# Patient Record
Sex: FEMALE | Race: WHITE | URBAN - METROPOLITAN AREA
[De-identification: names, ages, dates, MRNs, and addresses within clinical notes are randomized per-mention and may not be internally consistent; named-entity substitution may affect disease eponyms.]

---

## 2017-09-05 ENCOUNTER — IMPORTED ENCOUNTER (OUTPATIENT)
Dept: URBAN - METROPOLITAN AREA CLINIC 50 | Facility: CLINIC | Age: 72
End: 2017-09-05

## 2017-09-18 ENCOUNTER — IMPORTED ENCOUNTER (OUTPATIENT)
Dept: URBAN - METROPOLITAN AREA CLINIC 50 | Facility: CLINIC | Age: 72
End: 2017-09-18

## 2018-05-17 NOTE — PATIENT DISCUSSION
RECURRENT EROSION:  I have explained the nature of this process. Successful management will require prevention of corneal epithelial breakdown for an extended period of time. Lubricating ointment applied at bedtime for an extended period of time should help prevent a recurrence.

## 2018-09-24 ENCOUNTER — IMPORTED ENCOUNTER (OUTPATIENT)
Dept: URBAN - METROPOLITAN AREA CLINIC 50 | Facility: CLINIC | Age: 73
End: 2018-09-24

## 2019-10-07 ENCOUNTER — IMPORTED ENCOUNTER (OUTPATIENT)
Dept: URBAN - METROPOLITAN AREA CLINIC 50 | Facility: CLINIC | Age: 74
End: 2019-10-07

## 2020-10-14 ENCOUNTER — IMPORTED ENCOUNTER (OUTPATIENT)
Dept: URBAN - METROPOLITAN AREA CLINIC 50 | Facility: CLINIC | Age: 75
End: 2020-10-14

## 2021-04-18 ASSESSMENT — VISUAL ACUITY
OD_CC: J1+
OD_BAT: 20/25
OS_BAT: 20/25
OD_BAT: 20/25
OS_CC: 20/25
OD_OTHER: 20/30. 20/40.
OD_OTHER: 20/25. 20/25-.
OD_CC: 20/30-1
OS_CC: 20/20
OS_CC: J1+
OD_CC: J1+
OD_OTHER: 20/30. 20/30.
OS_OTHER: 20/25. 20/25.
OD_CC: 20/30
OS_OTHER: 20/25. 20/30.
OD_CC: 20/20-
OS_CC: J1+
OS_BAT: 20/25
OS_BAT: 20/25
OS_CC: J1
OS_CC: 20/25-2
OS_CC: J1+@ 15 IN
OS_CC: 20/25
OD_CC: 20/40+
OS_OTHER: 20/25. 20/60.
OS_BAT: 20/25
OD_BAT: 20/30
OS_OTHER: 20/25. 20/25-.
OD_CC: J1
OD_BAT: 20/30
OD_OTHER: 20/25. 20/30.
OD_CC: J1+@ 15 IN
OD_PH: 20/25

## 2021-04-18 ASSESSMENT — TONOMETRY
OS_IOP_MMHG: 16
OD_IOP_MMHG: 17
OD_IOP_MMHG: 17
OS_IOP_MMHG: 17
OS_IOP_MMHG: 18
OD_IOP_MMHG: 16
OD_IOP_MMHG: 19
OS_IOP_MMHG: 16

## 2021-10-13 ENCOUNTER — PREPPED CHART (OUTPATIENT)
Dept: URBAN - METROPOLITAN AREA CLINIC 49 | Facility: CLINIC | Age: 76
End: 2021-10-13

## 2021-10-18 ENCOUNTER — ANNUAL COMPREHENSIVE EXAM (OUTPATIENT)
Dept: URBAN - METROPOLITAN AREA CLINIC 49 | Facility: CLINIC | Age: 76
End: 2021-10-18

## 2021-10-18 DIAGNOSIS — H35.362: ICD-10-CM

## 2021-10-18 DIAGNOSIS — H25.13: ICD-10-CM

## 2021-10-18 DIAGNOSIS — H18.513: ICD-10-CM

## 2021-10-18 PROCEDURE — 92134 CPTRZ OPH DX IMG PST SGM RTA: CPT

## 2021-10-18 PROCEDURE — 92014 COMPRE OPH EXAM EST PT 1/>: CPT

## 2021-10-18 ASSESSMENT — VISUAL ACUITY
OS_GLARE: 20/30
OS_CC: 20/25
OU_CC: 20/30-1
OD_PH: 20/40
OS_CC: J2 @ 14IN
OD_GLARE: 20/40
OD_CC: 20/50
OD_CC: J7 @ 16IN
OU_CC: J2 @ 14IN
OS_GLARE: 20/25
OD_GLARE: 20/40

## 2021-10-18 ASSESSMENT — TONOMETRY
OD_IOP_MMHG: 18
OS_IOP_MMHG: 18

## 2022-10-24 ENCOUNTER — COMPREHENSIVE EXAM (OUTPATIENT)
Dept: URBAN - METROPOLITAN AREA CLINIC 49 | Facility: CLINIC | Age: 77
End: 2022-10-24

## 2022-10-24 DIAGNOSIS — H02.831: ICD-10-CM

## 2022-10-24 DIAGNOSIS — H02.834: ICD-10-CM

## 2022-10-24 DIAGNOSIS — H04.123: ICD-10-CM

## 2022-10-24 DIAGNOSIS — H25.13: ICD-10-CM

## 2022-10-24 PROCEDURE — 92134 CPTRZ OPH DX IMG PST SGM RTA: CPT

## 2022-10-24 PROCEDURE — 92014 COMPRE OPH EXAM EST PT 1/>: CPT

## 2022-10-24 ASSESSMENT — VISUAL ACUITY
OS_GLARE: 20/25
OD_GLARE: 20/60
OS_GLARE: 20/50
OS_CC: 20/40
OD_PH: 20/50
OD_GLARE: 20/50
OU_CC: J1 @ 14IN
OS_PH: 20/30
OD_CC: 20/70
OU_CC: 20/30

## 2022-10-24 ASSESSMENT — TONOMETRY
OD_IOP_MMHG: 17
OS_IOP_MMHG: 17

## 2022-11-08 ENCOUNTER — PRE-OP/H&P (OUTPATIENT)
Dept: URBAN - METROPOLITAN AREA CLINIC 49 | Facility: CLINIC | Age: 77
End: 2022-11-08

## 2022-11-08 DIAGNOSIS — H25.813: ICD-10-CM

## 2022-11-08 PROCEDURE — 92136 OPHTHALMIC BIOMETRY: CPT

## 2022-11-08 PROCEDURE — PREOP PRE OP VISIT

## 2022-11-08 ASSESSMENT — VISUAL ACUITY
OD_PH: 20/40
OS_SC: 20/40
OD_SC: 20/70

## 2022-11-08 ASSESSMENT — KERATOMETRY
OS_K1POWER_DIOPTERS: 45.75
OS_AXISANGLE2_DEGREES: 30
OD_K1POWER_DIOPTERS: 45.75
OD_K2POWER_DIOPTERS: 45.25
OS_AXISANGLE_DEGREES: 120
OD_AXISANGLE_DEGREES: 095
OD_AXISANGLE2_DEGREES: 5
OS_K2POWER_DIOPTERS: 45.37

## 2022-11-08 ASSESSMENT — TONOMETRY
OD_IOP_MMHG: 16
OS_IOP_MMHG: 16

## 2022-12-08 ENCOUNTER — SURGERY/PROCEDURE (OUTPATIENT)
Dept: URBAN - METROPOLITAN AREA SURGERY 16 | Facility: SURGERY | Age: 77
End: 2022-12-08

## 2022-12-08 ENCOUNTER — POST-OP (OUTPATIENT)
Dept: URBAN - METROPOLITAN AREA CLINIC 48 | Facility: LOCATION | Age: 77
End: 2022-12-08

## 2022-12-08 PROCEDURE — 66984 XCAPSL CTRC RMVL W/O ECP: CPT

## 2022-12-08 ASSESSMENT — KERATOMETRY
OS_K2POWER_DIOPTERS: 45.37
OD_K1POWER_DIOPTERS: 45.75
OD_AXISANGLE_DEGREES: 095
OS_AXISANGLE2_DEGREES: 30
OD_K1POWER_DIOPTERS: 45.75
OD_AXISANGLE_DEGREES: 095
OD_AXISANGLE2_DEGREES: 5
OS_AXISANGLE2_DEGREES: 30
OS_K1POWER_DIOPTERS: 45.75
OS_K1POWER_DIOPTERS: 45.75
OD_K2POWER_DIOPTERS: 45.25
OS_AXISANGLE_DEGREES: 120
OS_AXISANGLE_DEGREES: 120
OD_K2POWER_DIOPTERS: 45.25
OD_AXISANGLE2_DEGREES: 5
OS_K2POWER_DIOPTERS: 45.37

## 2022-12-08 ASSESSMENT — TONOMETRY: OD_IOP_MMHG: 14

## 2022-12-08 ASSESSMENT — VISUAL ACUITY: OD_SC: 20/60

## 2022-12-13 ENCOUNTER — POST OP/EVAL OF SECOND EYE (OUTPATIENT)
Dept: URBAN - METROPOLITAN AREA CLINIC 49 | Facility: CLINIC | Age: 77
End: 2022-12-13

## 2022-12-13 PROCEDURE — 99213 OFFICE O/P EST LOW 20 MIN: CPT

## 2022-12-13 PROCEDURE — 92136 - 2N OPHTHALMIC BIOMETRY BY PARTIAL COHERENCE INTERFEROMETRY WITH INTRAOCULAR LENS POWER CALCULATION

## 2022-12-13 ASSESSMENT — VISUAL ACUITY
OS_GLARE: 20/60
OS_GLARE: 20/80
OD_SC: 20/30-1
OS_CC: 20/40-1

## 2022-12-13 ASSESSMENT — TONOMETRY
OS_IOP_MMHG: 12
OD_IOP_MMHG: 12

## 2022-12-22 ENCOUNTER — SURGERY/PROCEDURE (OUTPATIENT)
Dept: URBAN - METROPOLITAN AREA SURGERY 16 | Facility: SURGERY | Age: 77
End: 2022-12-22

## 2022-12-22 PROCEDURE — 66984 XCAPSL CTRC RMVL W/O ECP: CPT

## 2022-12-23 ENCOUNTER — POST-OP (OUTPATIENT)
Dept: URBAN - METROPOLITAN AREA CLINIC 49 | Facility: CLINIC | Age: 77
End: 2022-12-23

## 2022-12-23 ASSESSMENT — VISUAL ACUITY
OS_SC: 20/40+2
OS_PH: 20/30

## 2022-12-23 ASSESSMENT — TONOMETRY: OS_IOP_MMHG: 16

## 2022-12-27 ENCOUNTER — POST-OP (OUTPATIENT)
Dept: URBAN - METROPOLITAN AREA CLINIC 49 | Facility: CLINIC | Age: 77
End: 2022-12-27

## 2022-12-27 PROCEDURE — 99024 POSTOP FOLLOW-UP VISIT: CPT

## 2022-12-27 ASSESSMENT — TONOMETRY
OS_IOP_MMHG: 14
OD_IOP_MMHG: 14

## 2022-12-27 ASSESSMENT — VISUAL ACUITY
OD_SC: 20/20- SLOW
OS_SC: 20/20
OU_SC: 20/20

## 2023-01-24 ENCOUNTER — POST-OP (OUTPATIENT)
Dept: URBAN - METROPOLITAN AREA CLINIC 49 | Facility: CLINIC | Age: 78
End: 2023-01-24

## 2023-01-24 DIAGNOSIS — Z98.41: ICD-10-CM

## 2023-01-24 DIAGNOSIS — Z98.42: ICD-10-CM

## 2023-01-24 PROCEDURE — 99024 POSTOP FOLLOW-UP VISIT: CPT

## 2023-01-24 PROCEDURE — 92015 DETERMINE REFRACTIVE STATE: CPT

## 2023-01-24 ASSESSMENT — TONOMETRY
OS_IOP_MMHG: 17
OD_IOP_MMHG: 15

## 2023-01-24 ASSESSMENT — VISUAL ACUITY
OU_SC: 20/20
OD_SC: 20/25
OS_SC: 20/25

## 2023-11-20 ENCOUNTER — COMPREHENSIVE EXAM (OUTPATIENT)
Dept: URBAN - METROPOLITAN AREA CLINIC 49 | Facility: LOCATION | Age: 78
End: 2023-11-20

## 2023-11-20 DIAGNOSIS — H02.831: ICD-10-CM

## 2023-11-20 DIAGNOSIS — H35.362: ICD-10-CM

## 2023-11-20 DIAGNOSIS — H04.123: ICD-10-CM

## 2023-11-20 DIAGNOSIS — H02.834: ICD-10-CM

## 2023-11-20 DIAGNOSIS — H43.813: ICD-10-CM

## 2023-11-20 DIAGNOSIS — H26.493: ICD-10-CM

## 2023-11-20 PROCEDURE — 92014 COMPRE OPH EXAM EST PT 1/>: CPT

## 2023-11-20 PROCEDURE — 92134 CPTRZ OPH DX IMG PST SGM RTA: CPT

## 2023-11-20 ASSESSMENT — VISUAL ACUITY
OS_GLARE: 20/20
OD_SC: 20/20-1
OS_SC: 20/20
OD_GLARE: 20/25
OD_GLARE: 20/20-1

## 2023-11-20 ASSESSMENT — TONOMETRY
OS_IOP_MMHG: 14
OD_IOP_MMHG: 13

## 2024-12-11 ENCOUNTER — COMPREHENSIVE EXAM (OUTPATIENT)
Age: 79
End: 2024-12-11

## 2024-12-11 DIAGNOSIS — H02.834: ICD-10-CM

## 2024-12-11 DIAGNOSIS — H52.4: ICD-10-CM

## 2024-12-11 DIAGNOSIS — H04.123: ICD-10-CM

## 2024-12-11 DIAGNOSIS — H35.362: ICD-10-CM

## 2024-12-11 DIAGNOSIS — H26.493: ICD-10-CM

## 2024-12-11 DIAGNOSIS — H02.831: ICD-10-CM

## 2024-12-11 DIAGNOSIS — H43.813: ICD-10-CM

## 2024-12-11 PROCEDURE — 99214 OFFICE O/P EST MOD 30 MIN: CPT
